# Patient Record
Sex: MALE | Race: WHITE | NOT HISPANIC OR LATINO | URBAN - METROPOLITAN AREA
[De-identification: names, ages, dates, MRNs, and addresses within clinical notes are randomized per-mention and may not be internally consistent; named-entity substitution may affect disease eponyms.]

---

## 2018-01-30 VITALS
SYSTOLIC BLOOD PRESSURE: 143 MMHG | OXYGEN SATURATION: 98 % | HEIGHT: 66 IN | DIASTOLIC BLOOD PRESSURE: 67 MMHG | WEIGHT: 139.99 LBS | TEMPERATURE: 97 F | RESPIRATION RATE: 15 BRPM | HEART RATE: 68 BPM

## 2018-01-30 NOTE — ASU PATIENT PROFILE, ADULT - PMH
Bipolar 1 disorder    CKD (chronic kidney disease)    DM (diabetes mellitus)    Duodenal ulcer    HLD (hyperlipidemia)    HTN (hypertension)    Stented coronary artery    TIA (transient ischemic attack) Bipolar 2 disorder, major depressive episode    CKD (chronic kidney disease)    DM (diabetes mellitus)    Duodenal ulcer    HLD (hyperlipidemia)    HTN (hypertension)    Stented coronary artery  9 stents  TIA (transient ischemic attack)

## 2018-01-30 NOTE — ASU PATIENT PROFILE, ADULT - PSH
Gastric bypass status for obesity  2003  [place] Gastric bypass status for obesity  2003  Hernia  X 2

## 2018-01-31 ENCOUNTER — OUTPATIENT (OUTPATIENT)
Dept: OUTPATIENT SERVICES | Facility: HOSPITAL | Age: 53
LOS: 1 days | Discharge: HOME | End: 2018-01-31
Payer: MEDICARE

## 2018-01-31 VITALS
HEART RATE: 80 BPM | OXYGEN SATURATION: 97 % | RESPIRATION RATE: 18 BRPM | SYSTOLIC BLOOD PRESSURE: 112 MMHG | DIASTOLIC BLOOD PRESSURE: 68 MMHG

## 2018-01-31 DIAGNOSIS — K46.9 UNSPECIFIED ABDOMINAL HERNIA WITHOUT OBSTRUCTION OR GANGRENE: Chronic | ICD-10-CM

## 2018-01-31 DIAGNOSIS — Z98.84 BARIATRIC SURGERY STATUS: Chronic | ICD-10-CM

## 2018-01-31 LAB — GLUCOSE BLDC GLUCOMTR-MCNC: 103 MG/DL — HIGH (ref 70–99)

## 2018-01-31 PROCEDURE — 54235 NJX CORPORA CAVERNOSA RX AGT: CPT

## 2018-01-31 PROCEDURE — 82962 GLUCOSE BLOOD TEST: CPT

## 2018-01-31 PROCEDURE — C1813: CPT

## 2018-01-31 PROCEDURE — 54360 PENIS PLASTIC SURGERY: CPT

## 2018-01-31 PROCEDURE — 54405 INSERT MULTI-COMP PENIS PROS: CPT

## 2018-01-31 PROCEDURE — C1889: CPT

## 2018-01-31 RX ORDER — VANCOMYCIN HCL 1 G
1000 VIAL (EA) INTRAVENOUS ONCE
Qty: 0 | Refills: 0 | Status: COMPLETED | OUTPATIENT
Start: 2018-01-31 | End: 2018-01-31

## 2018-01-31 RX ORDER — MORPHINE SULFATE 50 MG/1
4 CAPSULE, EXTENDED RELEASE ORAL EVERY 4 HOURS
Qty: 0 | Refills: 0 | Status: DISCONTINUED | OUTPATIENT
Start: 2018-01-31 | End: 2018-01-31

## 2018-01-31 RX ORDER — OXYCODONE AND ACETAMINOPHEN 5; 325 MG/1; MG/1
1 TABLET ORAL EVERY 4 HOURS
Qty: 0 | Refills: 0 | Status: DISCONTINUED | OUTPATIENT
Start: 2018-01-31 | End: 2018-01-31

## 2018-01-31 RX ORDER — ONDANSETRON 8 MG/1
4 TABLET, FILM COATED ORAL EVERY 6 HOURS
Qty: 0 | Refills: 0 | Status: DISCONTINUED | OUTPATIENT
Start: 2018-01-31 | End: 2018-01-31

## 2018-01-31 RX ORDER — GENTAMICIN SULFATE 40 MG/ML
80 VIAL (ML) INJECTION ONCE
Qty: 0 | Refills: 0 | Status: DISCONTINUED | OUTPATIENT
Start: 2018-01-31 | End: 2018-01-31

## 2018-01-31 RX ORDER — SODIUM CHLORIDE 9 MG/ML
1000 INJECTION, SOLUTION INTRAVENOUS
Qty: 0 | Refills: 0 | Status: DISCONTINUED | OUTPATIENT
Start: 2018-01-31 | End: 2018-01-31

## 2018-01-31 RX ADMIN — Medication 250 MILLIGRAM(S): at 09:46

## 2018-01-31 NOTE — PACU DISCHARGE NOTE - COMMENTS
Patient given all post op instructions and copy of provider information given to patient. VSS pain well controlled at this time. IV discontinued. Ambulating safely Tolerated po diet. . Left PACU with escort vai wheelchair to lobby.

## 2018-01-31 NOTE — BRIEF OPERATIVE NOTE - PROCEDURE
<<-----Click on this checkbox to enter Procedure Penile prosthesis placement  01/31/2018  w/ correction of penile angulation  Active  ANNEMARIE

## 2018-07-03 ENCOUNTER — NON-APPOINTMENT (OUTPATIENT)
Age: 53
End: 2018-07-03

## 2018-07-03 ENCOUNTER — APPOINTMENT (OUTPATIENT)
Dept: CARDIOLOGY | Facility: CLINIC | Age: 53
End: 2018-07-03

## 2018-07-03 VITALS
DIASTOLIC BLOOD PRESSURE: 76 MMHG | BODY MASS INDEX: 21.21 KG/M2 | SYSTOLIC BLOOD PRESSURE: 138 MMHG | TEMPERATURE: 97.8 F | HEIGHT: 66 IN | OXYGEN SATURATION: 99 % | HEART RATE: 54 BPM | WEIGHT: 132 LBS

## 2018-07-03 DIAGNOSIS — Z86.59 PERSONAL HISTORY OF OTHER MENTAL AND BEHAVIORAL DISORDERS: ICD-10-CM

## 2018-07-03 DIAGNOSIS — Z86.39 PERSONAL HISTORY OF OTHER ENDOCRINE, NUTRITIONAL AND METABOLIC DISEASE: ICD-10-CM

## 2018-07-03 DIAGNOSIS — F17.200 NICOTINE DEPENDENCE, UNSPECIFIED, UNCOMPLICATED: ICD-10-CM

## 2018-07-03 DIAGNOSIS — Z63.5 DISRUPTION OF FAMILY BY SEPARATION AND DIVORCE: ICD-10-CM

## 2018-07-03 PROBLEM — E78.5 HYPERLIPIDEMIA, UNSPECIFIED: Chronic | Status: ACTIVE | Noted: 2018-01-30

## 2018-07-03 PROBLEM — G45.9 TRANSIENT CEREBRAL ISCHEMIC ATTACK, UNSPECIFIED: Chronic | Status: ACTIVE | Noted: 2018-01-30

## 2018-07-03 PROBLEM — I10 ESSENTIAL (PRIMARY) HYPERTENSION: Chronic | Status: ACTIVE | Noted: 2018-01-30

## 2018-07-03 PROBLEM — E11.9 TYPE 2 DIABETES MELLITUS WITHOUT COMPLICATIONS: Chronic | Status: ACTIVE | Noted: 2018-01-30

## 2018-07-03 PROBLEM — N18.9 CHRONIC KIDNEY DISEASE, UNSPECIFIED: Chronic | Status: ACTIVE | Noted: 2018-01-30

## 2018-07-03 PROBLEM — Z95.5 PRESENCE OF CORONARY ANGIOPLASTY IMPLANT AND GRAFT: Chronic | Status: ACTIVE | Noted: 2018-01-30

## 2018-07-03 PROBLEM — F31.81 BIPOLAR II DISORDER: Chronic | Status: ACTIVE | Noted: 2018-01-30

## 2018-07-03 PROBLEM — K26.9 DUODENAL ULCER, UNSPECIFIED AS ACUTE OR CHRONIC, WITHOUT HEMORRHAGE OR PERFORATION: Chronic | Status: ACTIVE | Noted: 2018-01-30

## 2018-07-03 RX ORDER — DIVALPROEX SODIUM 500 1/1
500 TABLET, EXTENDED RELEASE ORAL
Qty: 90 | Refills: 0 | Status: ACTIVE | COMMUNITY
Start: 2017-07-28

## 2018-07-03 RX ORDER — MUPIROCIN 20 MG/G
2 OINTMENT TOPICAL
Qty: 22 | Refills: 0 | Status: ACTIVE | COMMUNITY
Start: 2018-03-15

## 2018-07-03 RX ORDER — ATORVASTATIN CALCIUM 80 MG/1
80 TABLET, FILM COATED ORAL
Qty: 90 | Refills: 0 | Status: ACTIVE | COMMUNITY
Start: 2018-01-11

## 2018-07-03 RX ORDER — PANTOPRAZOLE 40 MG/1
40 TABLET, DELAYED RELEASE ORAL
Qty: 30 | Refills: 0 | Status: ACTIVE | COMMUNITY
Start: 2017-02-06

## 2018-07-03 RX ORDER — LAMOTRIGINE 200 MG/1
200 TABLET ORAL
Qty: 90 | Refills: 0 | Status: ACTIVE | COMMUNITY
Start: 2017-11-24

## 2018-07-03 RX ORDER — BLOOD SUGAR DIAGNOSTIC
STRIP MISCELLANEOUS
Qty: 300 | Refills: 0 | Status: ACTIVE | COMMUNITY
Start: 2017-12-21

## 2018-07-03 SDOH — SOCIAL STABILITY - SOCIAL INSECURITY: DISRUPTION OF FAMILY BY SEPARATION AND DIVORCE: Z63.5

## 2019-03-26 ENCOUNTER — APPOINTMENT (OUTPATIENT)
Dept: HEMATOLOGY ONCOLOGY | Facility: CLINIC | Age: 54
End: 2019-03-26
Payer: MEDICARE

## 2019-03-26 ENCOUNTER — RESULT REVIEW (OUTPATIENT)
Age: 54
End: 2019-03-26

## 2019-03-26 VITALS
HEART RATE: 71 BPM | SYSTOLIC BLOOD PRESSURE: 162 MMHG | WEIGHT: 132.98 LBS | HEIGHT: 65.55 IN | OXYGEN SATURATION: 100 % | BODY MASS INDEX: 21.89 KG/M2 | RESPIRATION RATE: 16 BRPM | DIASTOLIC BLOOD PRESSURE: 75 MMHG | TEMPERATURE: 98.9 F

## 2019-03-26 DIAGNOSIS — Z87.898 PERSONAL HISTORY OF OTHER SPECIFIED CONDITIONS: ICD-10-CM

## 2019-03-26 DIAGNOSIS — Z82.49 FAMILY HISTORY OF ISCHEMIC HEART DISEASE AND OTHER DISEASES OF THE CIRCULATORY SYSTEM: ICD-10-CM

## 2019-03-26 DIAGNOSIS — F10.21 ALCOHOL DEPENDENCE, IN REMISSION: ICD-10-CM

## 2019-03-26 DIAGNOSIS — Z80.0 FAMILY HISTORY OF MALIGNANT NEOPLASM OF DIGESTIVE ORGANS: ICD-10-CM

## 2019-03-26 PROCEDURE — 99205 OFFICE O/P NEW HI 60 MIN: CPT

## 2019-03-26 RX ORDER — METOPROLOL TARTRATE 25 MG/1
25 TABLET, FILM COATED ORAL TWICE DAILY
Qty: 180 | Refills: 3 | Status: DISCONTINUED | COMMUNITY
Start: 2018-03-28 | End: 2019-03-26

## 2019-03-26 RX ORDER — VENLAFAXINE HYDROCHLORIDE 150 MG/1
150 CAPSULE, EXTENDED RELEASE ORAL
Qty: 90 | Refills: 0 | Status: DISCONTINUED | COMMUNITY
Start: 2018-04-13 | End: 2019-03-26

## 2019-03-27 RX ORDER — CHOLECALCIFEROL (VITAMIN D3) 1250 MCG
1.25 MG CAPSULE ORAL
Qty: 12 | Refills: 0 | Status: ACTIVE | COMMUNITY
Start: 2019-03-27 | End: 1900-01-01

## 2019-04-13 NOTE — ASSESSMENT
[FreeTextEntry1] : 52 yo male referred for evaluation of iron deficiency anemia and anemia due to chronic kidney disease.\par Patient underwent gastric bypass surgery and required intermittent iron infusion.\par In addition he has renal failure secondary o DM, with creatinine of 2.8 and EGF 24. 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 \par \par PLan \par - anemia work up with copper and zinc levels\par - consider intermittent hydration IV in view of increased BUN and creatinine \par - IV iron for ferritin < 50\par - EPO for HG < 10

## 2019-04-13 NOTE — HISTORY OF PRESENT ILLNESS
[de-identified] : 53 year old male presents today for initial consult of iron deficiency anemia.  He is s/p gastric bypass in 2003. He had GI bleed a few years later from GI bleed secondary to NSAIDs.  His hgb went to 3. His pouch had to be revised.  He became iron deficient. He did not tolerate oral iron and receives Venofer.  He is having left upper quadrant pain and is having balloon endoscopy at [facility].  Labs dated 3/12/19 show a HGB 10.3, HCT 32.0, MCHC 32.2.  Also noted is a creatinine of 3.0

## 2019-04-13 NOTE — CONSULT LETTER
[Dear  ___] : Dear  [unfilled], [Consult Letter:] : I had the pleasure of evaluating your patient, [unfilled]. [Please see my note below.] : Please see my note below. [Sincerely,] : Sincerely, [Consult Closing:] : Thank you very much for allowing me to participate in the care of this patient.  If you have any questions, please do not hesitate to contact me. [FreeTextEntry3] : Phyllis Pizarro MD\par [facility] [place] at [facility]\par

## 2019-05-07 ENCOUNTER — RESULT REVIEW (OUTPATIENT)
Age: 54
End: 2019-05-07

## 2019-05-14 ENCOUNTER — APPOINTMENT (OUTPATIENT)
Dept: HEMATOLOGY ONCOLOGY | Facility: CLINIC | Age: 54
End: 2019-05-14

## 2019-06-24 ENCOUNTER — APPOINTMENT (OUTPATIENT)
Dept: HEMATOLOGY ONCOLOGY | Facility: CLINIC | Age: 54
End: 2019-06-24
Payer: MEDICARE

## 2019-06-24 VITALS
SYSTOLIC BLOOD PRESSURE: 147 MMHG | BODY MASS INDEX: 24.03 KG/M2 | RESPIRATION RATE: 20 BRPM | OXYGEN SATURATION: 98 % | HEIGHT: 65.55 IN | TEMPERATURE: 99 F | WEIGHT: 146 LBS | DIASTOLIC BLOOD PRESSURE: 68 MMHG | HEART RATE: 72 BPM

## 2019-06-24 PROCEDURE — 99214 OFFICE O/P EST MOD 30 MIN: CPT

## 2019-06-24 NOTE — HISTORY OF PRESENT ILLNESS
[de-identified] : 53 year old male presents today for initial consult of iron deficiency anemia.  He is s/p gastric bypass in 2003. He had GI bleed a few years later from GI bleed secondary to NSAIDs.  His hgb went to 3. His pouch had to be revised.  He became iron deficient. He did not tolerate oral iron and receives Venofer.  He is having left upper quadrant pain and is having balloon endoscopy at [facility].  Labs dated 3/12/19 show a HGB 10.3, HCT 32.0, MCHC 32.2.  Also noted is a creatinine of 3.0 [de-identified] : Patient presetns today for follow up of anemia- having fatigue- had labs done last Thursday.

## 2019-06-24 NOTE — CONSULT LETTER
[FreeTextEntry3] : Phyllis Pizarro MD\par [facility] [place] at [facility]\par

## 2019-06-24 NOTE — ASSESSMENT
[FreeTextEntry1] : 54 yo male referred for evaluation of iron deficiency anemia and anemia due to chronic kidney disease.\par Patient underwent gastric bypass surgery and required intermittent iron infusion.\par In addition he has renal failure secondary o DM, with creatinine of 2.8 and EGF 24. 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 \par \par PLan \par - copper and zinc levels WNL\par -Ferritin 45, patient with fatigue, anemia, malabsorption\par - schedule IV Injectafer x 2

## 2019-07-29 ENCOUNTER — NON-APPOINTMENT (OUTPATIENT)
Age: 54
End: 2019-07-29

## 2019-07-29 ENCOUNTER — APPOINTMENT (OUTPATIENT)
Dept: INTERNAL MEDICINE | Facility: CLINIC | Age: 54
End: 2019-07-29
Payer: MEDICARE

## 2019-07-29 VITALS
HEIGHT: 65.5 IN | SYSTOLIC BLOOD PRESSURE: 110 MMHG | HEART RATE: 72 BPM | BODY MASS INDEX: 23.04 KG/M2 | WEIGHT: 140 LBS | OXYGEN SATURATION: 98 % | DIASTOLIC BLOOD PRESSURE: 70 MMHG

## 2019-07-29 DIAGNOSIS — R06.00 DYSPNEA, UNSPECIFIED: ICD-10-CM

## 2019-07-29 PROCEDURE — 94010 BREATHING CAPACITY TEST: CPT

## 2019-07-29 PROCEDURE — 99204 OFFICE O/P NEW MOD 45 MIN: CPT | Mod: 25

## 2019-07-29 NOTE — HISTORY OF PRESENT ILLNESS
[FreeTextEntry1] : Patient with waking up gasping for air [de-identified] : Patient is a 54-year-old smoker of one pack per day for more than 30 years. He has a history of coronary artery disease with stents x11, history of diabetes and chronic kidney disease. The past one month he complains of waking up about 5-10 minutes after falling asleep with gasping for aunt. This occurs for a very short period of time and he is able to fall back asleep. He denies loud snoring. He is sleeps alone. He is not sure of witnessed apneas. He did have a history of sleep apnea syndrome when he was 280 pounds 16 years ago. He now weighs 135 pounds. He denies shortness of breath with exertion and is able to walk a mile without any problem. He does have an occasional cough with light yellow sputum. He denies wheezing. He again has no problems with shortness of breath during the day. His only problem is waking up gasping for air assessment going on for the past one month.

## 2019-07-29 NOTE — PHYSICAL EXAM
[No Acute Distress] : no acute distress [Well Nourished] : well nourished [Well Developed] : well developed [Well-Appearing] : well-appearing [Normal Sclera/Conjunctiva] : normal sclera/conjunctiva [PERRL] : pupils equal round and reactive to light [No JVD] : no jugular venous distention [Normal Outer Ear/Nose] : the outer ears and nose were normal in appearance [Normal Oropharynx] : the oropharynx was normal [EOMI] : extraocular movements intact [No Lymphadenopathy] : no lymphadenopathy [Supple] : supple [Thyroid Normal, No Nodules] : the thyroid was normal and there were no nodules present [Clear to Auscultation] : lungs were clear to auscultation bilaterally [No Respiratory Distress] : no respiratory distress  [No Accessory Muscle Use] : no accessory muscle use [Regular Rhythm] : with a regular rhythm [Normal Rate] : normal rate  [No Carotid Bruits] : no carotid bruits [Normal S1, S2] : normal S1 and S2 [No Murmur] : no murmur heard [Pedal Pulses Present] : the pedal pulses are present [No Abdominal Bruit] : a ~M bruit was not heard ~T in the abdomen [No Varicosities] : no varicosities [No Extremity Clubbing/Cyanosis] : no extremity clubbing/cyanosis [No Palpable Aorta] : no palpable aorta [No Edema] : there was no peripheral edema [Non-distended] : non-distended [Non Tender] : non-tender [Soft] : abdomen soft [No Masses] : no abdominal mass palpated [No HSM] : no HSM [Normal Posterior Cervical Nodes] : no posterior cervical lymphadenopathy [Normal Bowel Sounds] : normal bowel sounds [Normal Anterior Cervical Nodes] : no anterior cervical lymphadenopathy [No Spinal Tenderness] : no spinal tenderness [No CVA Tenderness] : no CVA  tenderness [No Rash] : no rash [Grossly Normal Strength/Tone] : grossly normal strength/tone [No Joint Swelling] : no joint swelling [Normal Gait] : normal gait [No Focal Deficits] : no focal deficits [Coordination Grossly Intact] : coordination grossly intact [Normal Affect] : the affect was normal [Deep Tendon Reflexes (DTR)] : deep tendon reflexes were 2+ and symmetric [Normal Insight/Judgement] : insight and judgment were intact

## 2019-07-29 NOTE — ASSESSMENT
[FreeTextEntry1] : Patient with a one-month history of occasionally waking up gasping for air. Patient's spirometry reveals mild obstructive obstruction with an FEV1 of 75% of predicted. FVC is 99% of predicted. The etiology of this complaint is somewhat unclear. I cannot rule out obstructive sleep apnea syndrome without a sleep study. I am recommending the patient observe the symptoms of the following one month and to call me at that time. We may try to get a sleep study approved. In the meanwhile I would recommend the patient consider a low dose CT of the chest for lung cancer screening in view of the history of heavy smoking exposure.

## 2019-07-29 NOTE — PLAN
[FreeTextEntry1] : Patient is to observe symptoms of waking up gasping in one month and to call me at that time. We may consider obtaining a sleep study. Patient is also advised to have a low dose CT of the chest for lung cancer screening.

## 2019-07-31 ENCOUNTER — APPOINTMENT (OUTPATIENT)
Dept: NEUROLOGY | Facility: CLINIC | Age: 54
End: 2019-07-31
Payer: MEDICARE

## 2019-07-31 VITALS
HEIGHT: 65.5 IN | BODY MASS INDEX: 23.04 KG/M2 | TEMPERATURE: 98 F | WEIGHT: 140 LBS | HEART RATE: 65 BPM | DIASTOLIC BLOOD PRESSURE: 70 MMHG | SYSTOLIC BLOOD PRESSURE: 110 MMHG

## 2019-07-31 PROCEDURE — 99204 OFFICE O/P NEW MOD 45 MIN: CPT

## 2019-07-31 NOTE — PHYSICAL EXAM
[General Appearance - Alert] : alert [General Appearance - In No Acute Distress] : in no acute distress [Oriented To Time, Place, And Person] : oriented to person, place, and time [Impaired Insight] : insight and judgment were intact [Affect] : the affect was normal [Person] : oriented to person [Place] : oriented to place [Time] : oriented to time [Concentration Intact] : normal concentrating ability [Visual Intact] : visual attention was ~T not ~L decreased [Naming Objects] : no difficulty naming common objects [Repeating Phrases] : no difficulty repeating a phrase [Writing A Sentence] : no difficulty writing a sentence [Fluency] : fluency intact [Comprehension] : comprehension intact [Reading] : reading intact [Past History] : adequate knowledge of personal past history [Cranial Nerves Optic (II)] : visual acuity intact bilaterally,  visual fields full to confrontation, pupils equal round and reactive to light [Cranial Nerves Oculomotor (III)] : extraocular motion intact [Cranial Nerves Trigeminal (V)] : facial sensation intact symmetrically [Cranial Nerves Facial (VII)] : face symmetrical [Cranial Nerves Vestibulocochlear (VIII)] : hearing was intact bilaterally [Cranial Nerves Glossopharyngeal (IX)] : tongue and palate midline [Cranial Nerves Accessory (XI - Cranial And Spinal)] : head turning and shoulder shrug symmetric [Cranial Nerves Hypoglossal (XII)] : there was no tongue deviation with protrusion [Motor Tone] : muscle tone was normal in all four extremities [Motor Strength] : muscle strength was normal in all four extremities [No Muscle Atrophy] : normal bulk in all four extremities [Abnormal Walk] : normal gait [Balance] : balance was intact [2+] : Ankle jerk left 2+ [Tremor] : no tremor present [Past-pointing] : there was no past-pointing [Plantar Reflex Left Only] : normal on the left [Plantar Reflex Right Only] : normal on the right [FreeTextEntry7] : decreased    peripherally    to pin

## 2019-07-31 NOTE — ASSESSMENT
[FreeTextEntry1] : likely  vaso-  vagal   syncope\par r/o  ANS    dysfunction  (due    to  DM),   would  like  to  refer    to   Tilt  table   test\par MRI    of  the    brain\par EEG\par TIA   is  also    possibility,  he    has   an increased    risk   for    TIA\par Sufficient     hydration    is   crucial, however  challenging     due  to    CKD.  (mild   LE  peripheral    edema).  Would     consider     compression   stockings.

## 2019-07-31 NOTE — HISTORY OF PRESENT ILLNESS
[FreeTextEntry1] : This is  a     54    y.o.  male     who presented   for    evaluation  of   episode    of   LOC  while    lifting  a    heavy     weight.    He    denies  a    clear    prodromal   symptoms:  no   heart   palpitations,  no  CP,   no     diaphoresis     or     visual   disturbance.     he   denies        similar     episodes  in the    past.    He   states that    after    he   regained  the    consciousness     he    was   AAOx3. He    has    been    followed   by   cardiologist  and   has  lacie  appointment   with     his   cardiologist   today.  He    reports     mild    tinging   and  numbness    in  his  toes    which    has   been    attributed  to  the     h/o    DM.   He     states    that   it    is  a  possibility      that    he    was  dehydrated   at the    time    of  the    episode.   He    has   a   h/o   anemia (iron    deficiency).  S/p    bariatric   surgery.

## 2019-07-31 NOTE — REVIEW OF SYSTEMS
[Heartburn] : heartburn [Negative] : Heme/Lymph [Numbness] : numbness [Tingling] : tingling [Fainting] : fainting [FreeTextEntry2] : fatigue [FreeTextEntry5] : leg swelling,neck pain

## 2019-08-02 ENCOUNTER — APPOINTMENT (OUTPATIENT)
Dept: NEUROLOGY | Facility: CLINIC | Age: 54
End: 2019-08-02

## 2019-08-09 ENCOUNTER — APPOINTMENT (OUTPATIENT)
Dept: NEUROLOGY | Facility: CLINIC | Age: 54
End: 2019-08-09

## 2019-08-13 ENCOUNTER — APPOINTMENT (OUTPATIENT)
Dept: NEUROLOGY | Facility: CLINIC | Age: 54
End: 2019-08-13
Payer: MEDICARE

## 2019-08-13 PROCEDURE — 95819 EEG AWAKE AND ASLEEP: CPT

## 2019-08-15 ENCOUNTER — RESULT REVIEW (OUTPATIENT)
Age: 54
End: 2019-08-15

## 2019-08-21 ENCOUNTER — OTHER (OUTPATIENT)
Age: 54
End: 2019-08-21

## 2019-08-22 ENCOUNTER — OTHER (OUTPATIENT)
Age: 54
End: 2019-08-22

## 2019-08-23 ENCOUNTER — RESULT REVIEW (OUTPATIENT)
Age: 54
End: 2019-08-23

## 2019-09-03 ENCOUNTER — NON-APPOINTMENT (OUTPATIENT)
Age: 54
End: 2019-09-03

## 2019-09-03 ENCOUNTER — APPOINTMENT (OUTPATIENT)
Dept: CARDIOLOGY | Facility: CLINIC | Age: 54
End: 2019-09-03
Payer: MEDICARE

## 2019-09-03 VITALS
OXYGEN SATURATION: 99 % | DIASTOLIC BLOOD PRESSURE: 74 MMHG | RESPIRATION RATE: 12 BRPM | BODY MASS INDEX: 23.43 KG/M2 | WEIGHT: 143 LBS | SYSTOLIC BLOOD PRESSURE: 90 MMHG | HEART RATE: 60 BPM

## 2019-09-03 DIAGNOSIS — R42 DIZZINESS AND GIDDINESS: ICD-10-CM

## 2019-09-03 DIAGNOSIS — R55 SYNCOPE AND COLLAPSE: ICD-10-CM

## 2019-09-03 PROCEDURE — 99214 OFFICE O/P EST MOD 30 MIN: CPT

## 2019-09-03 PROCEDURE — 93000 ELECTROCARDIOGRAM COMPLETE: CPT

## 2019-09-03 RX ORDER — ISOSORBIDE MONONITRATE 30 MG/1
30 TABLET, EXTENDED RELEASE ORAL
Qty: 30 | Refills: 0 | Status: ACTIVE | COMMUNITY
Start: 2019-01-29

## 2019-09-03 RX ORDER — INSULIN GLARGINE 100 [IU]/ML
100 INJECTION, SOLUTION SUBCUTANEOUS
Qty: 3 | Refills: 0 | Status: ACTIVE | COMMUNITY
Start: 2019-05-21

## 2019-09-03 NOTE — DISCUSSION/SUMMARY
[FreeTextEntry1] : Dyspnea\par Patient with a significant cardiac history, details not available to me at this time.\par He reports feeling dyspneic upon trying to lie down today, on a background of 4-5 days of a upper respiratory symptoms in the form of productive cough of greenish sputum, congestion. He also gets allergies and says they have been bad this year. He takes OTC Claritin\par He says that he is cardiac symptom is usually chest pressure which he hasn't had\par His EKG today is normal\par I suspect his symptoms is related to he is allergies/possible URI\par There is the suggestion of congestive heart failure\par Rec:\par CXR to r/o infiltrate -> call to discuss\par Same medical regimen for now\par \par 2.  HTN\par Treated. Blood pressure is slightly suboptimal given history.\par Rec:\par Follow for now. given context\par \par 3.   CAD\par History of CAD with reported multiple stents in the past\par No symptoms to suggest angina at this time\par EKG is as described\par Rec:\par Same regimen\par \par \par

## 2019-09-04 ENCOUNTER — RESULT REVIEW (OUTPATIENT)
Age: 54
End: 2019-09-04

## 2019-09-11 ENCOUNTER — APPOINTMENT (OUTPATIENT)
Dept: INTERNAL MEDICINE | Facility: CLINIC | Age: 54
End: 2019-09-11
Payer: MEDICARE

## 2019-09-11 VITALS
WEIGHT: 147 LBS | BODY MASS INDEX: 24.2 KG/M2 | SYSTOLIC BLOOD PRESSURE: 120 MMHG | OXYGEN SATURATION: 97 % | HEART RATE: 72 BPM | DIASTOLIC BLOOD PRESSURE: 60 MMHG | HEIGHT: 65.5 IN

## 2019-09-11 PROCEDURE — 99213 OFFICE O/P EST LOW 20 MIN: CPT

## 2019-09-11 NOTE — HISTORY OF PRESENT ILLNESS
[FreeTextEntry1] : Followup to lung cancer screening CAT scan [de-identified] : Patient continues to smoke one pack per day. He is here now to discuss the results of his screening CT scan. This reveals 3 separate 4 mm nodules. There is a calcified right lower lobe nodule, a left-sided haroon-fissural nodule and a 4 mm left upper lobe nodule that was present in 2013. Overall these nodules are highly likely to be benign and no concerns. Patient is reassured.

## 2019-09-11 NOTE — PHYSICAL EXAM
[No Acute Distress] : no acute distress [Well Nourished] : well nourished [Well Developed] : well developed [Well-Appearing] : well-appearing [Normal Sclera/Conjunctiva] : normal sclera/conjunctiva [PERRL] : pupils equal round and reactive to light [EOMI] : extraocular movements intact [Normal Outer Ear/Nose] : the outer ears and nose were normal in appearance [Normal Oropharynx] : the oropharynx was normal [No JVD] : no jugular venous distention [Supple] : supple [No Lymphadenopathy] : no lymphadenopathy [Thyroid Normal, No Nodules] : the thyroid was normal and there were no nodules present [No Respiratory Distress] : no respiratory distress  [No Accessory Muscle Use] : no accessory muscle use [Clear to Auscultation] : lungs were clear to auscultation bilaterally [Normal Rate] : normal rate  [Regular Rhythm] : with a regular rhythm [Normal S1, S2] : normal S1 and S2 [No Murmur] : no murmur heard [No Carotid Bruits] : no carotid bruits [No Abdominal Bruit] : a ~M bruit was not heard ~T in the abdomen [No Varicosities] : no varicosities [Pedal Pulses Present] : the pedal pulses are present [No Edema] : there was no peripheral edema [No Palpable Aorta] : no palpable aorta [No Extremity Clubbing/Cyanosis] : no extremity clubbing/cyanosis [Soft] : abdomen soft [Non Tender] : non-tender [Non-distended] : non-distended [No Masses] : no abdominal mass palpated [No HSM] : no HSM [Normal Posterior Cervical Nodes] : no posterior cervical lymphadenopathy [Normal Bowel Sounds] : normal bowel sounds [Normal Anterior Cervical Nodes] : no anterior cervical lymphadenopathy [No CVA Tenderness] : no CVA  tenderness [No Spinal Tenderness] : no spinal tenderness [No Joint Swelling] : no joint swelling [Grossly Normal Strength/Tone] : grossly normal strength/tone [No Rash] : no rash [Coordination Grossly Intact] : coordination grossly intact [No Focal Deficits] : no focal deficits [Normal Gait] : normal gait [Deep Tendon Reflexes (DTR)] : deep tendon reflexes were 2+ and symmetric [Normal Affect] : the affect was normal [Normal Insight/Judgement] : insight and judgment were intact

## 2019-09-11 NOTE — ASSESSMENT
[FreeTextEntry1] : Pulmonary nodules are highly likely to be benign. The patient is reassured. He is advised to repeat the CAT scan in one year. In terms of his continual smoking I've advised the patient tried the nicotine patch as well as the nicotine gum if he has cravings.

## 2019-10-02 ENCOUNTER — APPOINTMENT (OUTPATIENT)
Dept: HEMATOLOGY ONCOLOGY | Facility: CLINIC | Age: 54
End: 2019-10-02

## 2019-10-04 ENCOUNTER — APPOINTMENT (OUTPATIENT)
Dept: NEUROLOGY | Facility: CLINIC | Age: 54
End: 2019-10-04

## 2019-11-20 ENCOUNTER — FORM ENCOUNTER (OUTPATIENT)
Age: 54
End: 2019-11-20

## 2019-11-26 ENCOUNTER — FORM ENCOUNTER (OUTPATIENT)
Age: 54
End: 2019-11-26

## 2019-11-27 ENCOUNTER — APPOINTMENT (OUTPATIENT)
Dept: CARDIOLOGY | Facility: CLINIC | Age: 54
End: 2019-11-27
Payer: MEDICARE

## 2019-11-27 ENCOUNTER — NON-APPOINTMENT (OUTPATIENT)
Age: 54
End: 2019-11-27

## 2019-11-27 VITALS
OXYGEN SATURATION: 99 % | BODY MASS INDEX: 23.76 KG/M2 | SYSTOLIC BLOOD PRESSURE: 138 MMHG | DIASTOLIC BLOOD PRESSURE: 80 MMHG | WEIGHT: 145 LBS | RESPIRATION RATE: 12 BRPM | HEART RATE: 58 BPM

## 2019-11-27 DIAGNOSIS — R07.9 CHEST PAIN, UNSPECIFIED: ICD-10-CM

## 2019-11-27 PROCEDURE — 93000 ELECTROCARDIOGRAM COMPLETE: CPT

## 2019-11-27 PROCEDURE — 99214 OFFICE O/P EST MOD 30 MIN: CPT

## 2019-11-27 NOTE — ASSESSMENT
[FreeTextEntry1] : 55 yo male with reported CAD and multiple prior coronary stents (last PCI reportedly in Feb 2019). He presents with episode of epigastric/chest discomfort which began this morning, but is different from his usual anginal symptoms.\par \par ECG today demonstrated sinus bradycardia with no acute ST-T changes from his prior ECG in Sept 2019.\par Will continue to monitor clinically for now on current medical management. \par Should his symptoms recur or become associated with exertion, he should contact Dr. Mccall at [place] or my office to arrange for further evaluation with either nuclear stress test or cardiac catheterization given his reported extensive cardiac history.\par Will try to obtain recent 2/2019 cardiac cath report from [place] for review to determine extent of his CAD and prior stents as I do not any details regarding his prior coronary interventions.

## 2019-11-27 NOTE — HISTORY OF PRESENT ILLNESS
[FreeTextEntry1] : 53 yo male with reported CAD and multiple prior coronary stents, who presents today with complaint of recurrent vertigo. He reports being told he had a mildly elevated K+ = 5.2 and was concerned about possible arrhythmias as cause of his reported vertigo and episode of syncope ~ 1. 5 months ago. He reports intermittent vertigo over the past week and reported a syncopal episode ~ 1.5 months ago. He reported extensive neurologic examination at [place], which was negative and was thought to be vasovagal in etiology. He is currently pending a tilt table study.  \par \par Cardiologist:  Dr. Amari Martínez ([place])\par Interventional cardiology: Dr. Mccall ([place])\par Renal: Dr. Butler ([place])

## 2019-11-27 NOTE — REASON FOR VISIT
[Follow-Up - Clinic] : a clinic follow-up of [Chest Pain] : chest pain [Coronary Artery Disease] : coronary artery disease [Syncope] : syncope

## 2019-11-27 NOTE — ASSESSMENT
[FreeTextEntry1] : 53 yo male with reported CAD and multiple prior coronary stents, with reported syncope ~1.5 months ago and now recurrent vertigo over the past week. \par \par Given reported vertigo with changes in his position/head movement, suspect that he may have benign paroxysmal positional vertigo. \par With regard to his reported syncope, he may have had a vasovagal episode and is currently pending further evaluation with tilt table study. \par ECG today does not demonstrate QT prolongation or pre-excitation pattern.\par He reports extensive recent neurologic evaluation, which has been negative to date.\par Patient was advised to proceed with his tilt table study. If tilt table study is unremarkable, patient may need echocardiogram and event monitor if not done recently with his cardiologist at [place]. Patient to notify this office of results of tilt table study.\par \par BP is adequately controlled currently. Patient was advised to speak with his nephrologist about his chlorthalidone as his diuretic use may also be a contributing factor to his current symptoms if he becomes intravascularly depleted.

## 2019-11-27 NOTE — HISTORY OF PRESENT ILLNESS
[FreeTextEntry1] : 53 yo male with reported CAD and multiple prior coronary stents (most recent PCI x2 in Feb 2019 at [place] per patient's report), who presents today with complaint substernal/epigastric chest discomfort which began this morning at rest. He describes it as vague discomfort and thinks it may be GI in etiology as it is different from his prior angina. He denies associated dyspnea, nausea, or vomiting. Patient denies palpitations, syncope, edema, melena, hematochezia, or hematemesis.\par \par Interventional cardiologist: Dr. Mccall ([place])\par Renal: Dr. Butler ([place])

## 2019-11-27 NOTE — PHYSICAL EXAM
[General Appearance - Well Developed] : well developed [General Appearance - Well Nourished] : well nourished [Normal Conjunctiva] : the conjunctiva exhibited no abnormalities [] : no respiratory distress [Respiration, Rhythm And Depth] : normal respiratory rhythm and effort [Auscultation Breath Sounds / Voice Sounds] : lungs were clear to auscultation bilaterally [Abnormal Walk] : normal gait [Nail Clubbing] : no clubbing of the fingernails [Cyanosis, Localized] : no localized cyanosis [Oriented To Time, Place, And Person] : oriented to person, place, and time [Normal Rate] : normal [Rhythm Regular] : regular [Normal S1] : normal S1 [Normal S2] : normal S2 [No Murmur] : no murmurs heard [No Pitting Edema] : no pitting edema present [FreeTextEntry1] : No JVD present [S3] : no S3 [S4] : no S4

## 2019-11-27 NOTE — REVIEW OF SYSTEMS
[Negative] : Heme/Lymph [Chest Pain] : chest pain [Shortness Of Breath] : no shortness of breath [Dyspnea on exertion] : not dyspnea during exertion [Lower Ext Edema] : no extremity edema [Palpitations] : no palpitations

## 2019-11-27 NOTE — PHYSICAL EXAM
[General Appearance - Well Developed] : well developed [General Appearance - Well Nourished] : well nourished [Normal Conjunctiva] : the conjunctiva exhibited no abnormalities [Heart Rate And Rhythm] : heart rate and rhythm were normal [Heart Sounds] : normal S1 and S2 [Murmurs] : no murmurs present [Bowel Sounds] : normal bowel sounds [Abdomen Soft] : soft [Abdomen Tenderness] : non-tender [Abnormal Walk] : normal gait [Skin Color & Pigmentation] : normal skin color and pigmentation [Oriented To Time, Place, And Person] : oriented to person, place, and time [] : no respiratory distress [Respiration, Rhythm And Depth] : normal respiratory rhythm and effort [Auscultation Breath Sounds / Voice Sounds] : lungs were clear to auscultation bilaterally [Normal Rate] : normal [Rhythm Regular] : regular [Normal S1] : normal S1 [Normal S2] : normal S2 [No Murmur] : no murmurs heard [No Pitting Edema] : no pitting edema present [Nail Clubbing] : no clubbing of the fingernails [Cyanosis, Localized] : no localized cyanosis [FreeTextEntry1] : No JVD present [S3] : no S3 [S4] : no S4

## 2020-06-01 ENCOUNTER — APPOINTMENT (OUTPATIENT)
Dept: CARDIOLOGY | Facility: CLINIC | Age: 55
End: 2020-06-01

## 2020-06-01 VITALS
BODY MASS INDEX: 29.54 KG/M2 | WEIGHT: 180.25 LBS | OXYGEN SATURATION: 96 % | DIASTOLIC BLOOD PRESSURE: 80 MMHG | HEART RATE: 77 BPM | SYSTOLIC BLOOD PRESSURE: 130 MMHG

## 2020-06-01 VITALS
BODY MASS INDEX: 29.54 KG/M2 | SYSTOLIC BLOOD PRESSURE: 130 MMHG | WEIGHT: 180.25 LBS | HEART RATE: 77 BPM | OXYGEN SATURATION: 96 % | DIASTOLIC BLOOD PRESSURE: 80 MMHG

## 2020-07-16 ENCOUNTER — APPOINTMENT (OUTPATIENT)
Dept: CARDIOLOGY | Facility: CLINIC | Age: 55
End: 2020-07-16
Payer: MEDICARE

## 2020-07-16 VITALS
SYSTOLIC BLOOD PRESSURE: 110 MMHG | HEART RATE: 73 BPM | OXYGEN SATURATION: 99 % | DIASTOLIC BLOOD PRESSURE: 70 MMHG | BODY MASS INDEX: 23.43 KG/M2 | WEIGHT: 143 LBS

## 2020-07-16 DIAGNOSIS — F14.90 COCAINE USE, UNSPECIFIED, UNCOMPLICATED: ICD-10-CM

## 2020-07-16 DIAGNOSIS — F17.200 NICOTINE DEPENDENCE, UNSPECIFIED, UNCOMPLICATED: ICD-10-CM

## 2020-07-16 DIAGNOSIS — E78.5 HYPERLIPIDEMIA, UNSPECIFIED: ICD-10-CM

## 2020-07-16 DIAGNOSIS — Z98.890 OTHER SPECIFIED POSTPROCEDURAL STATES: ICD-10-CM

## 2020-07-16 DIAGNOSIS — F10.21 ALCOHOL DEPENDENCE, IN REMISSION: ICD-10-CM

## 2020-07-16 DIAGNOSIS — I10 ESSENTIAL (PRIMARY) HYPERTENSION: ICD-10-CM

## 2020-07-16 DIAGNOSIS — Z86.73 PERSONAL HISTORY OF TRANSIENT ISCHEMIC ATTACK (TIA), AND CEREBRAL INFARCTION W/OUT RESIDUAL DEFICITS: ICD-10-CM

## 2020-07-16 PROCEDURE — 93000 ELECTROCARDIOGRAM COMPLETE: CPT

## 2020-07-16 PROCEDURE — 99215 OFFICE O/P EST HI 40 MIN: CPT

## 2020-07-19 ENCOUNTER — NON-APPOINTMENT (OUTPATIENT)
Age: 55
End: 2020-07-19

## 2020-07-20 PROBLEM — E78.5 HYPERLIPIDEMIA, UNSPECIFIED HYPERLIPIDEMIA TYPE: Status: ACTIVE | Noted: 2018-07-03

## 2020-07-20 PROBLEM — Z98.890 HISTORY OF CARDIAC CATH: Status: RESOLVED | Noted: 2020-07-20 | Resolved: 2020-07-20

## 2020-07-20 PROBLEM — I10 BENIGN ESSENTIAL HTN: Status: ACTIVE | Noted: 2018-07-03

## 2020-07-20 PROBLEM — Z86.73 HISTORY OF TIA (TRANSIENT ISCHEMIC ATTACK): Status: RESOLVED | Noted: 2020-07-16 | Resolved: 2020-07-20

## 2020-07-20 PROBLEM — F17.200 SMOKER: Status: ACTIVE | Noted: 2019-07-29

## 2020-07-20 RX ORDER — GLIPIZIDE 10 MG/1
10 TABLET, FILM COATED, EXTENDED RELEASE ORAL
Qty: 180 | Refills: 1 | Status: ACTIVE | COMMUNITY
Start: 2019-03-04

## 2020-07-20 RX ORDER — NITROGLYCERIN 0.4 MG/1
0.4 TABLET SUBLINGUAL
Qty: 25 | Refills: 0 | Status: ACTIVE | COMMUNITY
Start: 2020-04-17

## 2020-07-20 RX ORDER — ALFUZOSIN HYDROCHLORIDE 10 MG/1
10 TABLET, EXTENDED RELEASE ORAL
Qty: 90 | Refills: 0 | Status: ACTIVE | COMMUNITY
Start: 2019-11-25

## 2020-07-20 RX ORDER — ERGOCALCIFEROL 1.25 MG/1
1.25 MG CAPSULE, LIQUID FILLED ORAL
Qty: 12 | Refills: 0 | Status: DISCONTINUED | COMMUNITY
Start: 2017-11-03 | End: 2020-07-20

## 2020-07-20 RX ORDER — PATIROMER 25.2 G/1
POWDER, FOR SUSPENSION ORAL
Refills: 0 | Status: ACTIVE | COMMUNITY
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
## 2020-07-20 NOTE — PHYSICAL EXAM
[General Appearance - Well Nourished] : well nourished [General Appearance - Well Developed] : well developed [Normal Conjunctiva] : the conjunctiva exhibited no abnormalities [Heart Rate And Rhythm] : heart rate and rhythm were normal [Murmurs] : no murmurs present [Heart Sounds] : normal S1 and S2 [Abdomen Soft] : soft [Bowel Sounds] : normal bowel sounds [Abdomen Tenderness] : non-tender [Abnormal Walk] : normal gait [Oriented To Time, Place, And Person] : oriented to person, place, and time [Skin Color & Pigmentation] : normal skin color and pigmentation [Nail Clubbing] : no clubbing of the fingernails [FreeTextEntry1] : Trivial edema along lower shins (L>R)

## 2020-11-04 ENCOUNTER — RESULT REVIEW (OUTPATIENT)
Age: 55
End: 2020-11-04

## 2020-11-04 ENCOUNTER — APPOINTMENT (OUTPATIENT)
Dept: HEMATOLOGY ONCOLOGY | Facility: CLINIC | Age: 55
End: 2020-11-04
Payer: MEDICARE

## 2020-11-04 VITALS
HEART RATE: 65 BPM | RESPIRATION RATE: 16 BRPM | DIASTOLIC BLOOD PRESSURE: 81 MMHG | BODY MASS INDEX: 25.51 KG/M2 | SYSTOLIC BLOOD PRESSURE: 154 MMHG | HEIGHT: 65.47 IN | OXYGEN SATURATION: 96 % | WEIGHT: 154.98 LBS | TEMPERATURE: 97.1 F

## 2020-11-04 DIAGNOSIS — R91.8 OTHER NONSPECIFIC ABNORMAL FINDING OF LUNG FIELD: ICD-10-CM

## 2020-11-04 PROCEDURE — 99214 OFFICE O/P EST MOD 30 MIN: CPT

## 2020-11-04 NOTE — CONSULT LETTER
[Dear  ___] : Dear  [unfilled], [Consult Letter:] : I had the pleasure of evaluating your patient, [unfilled]. [Please see my note below.] : Please see my note below. [Consult Closing:] : Thank you very much for allowing me to participate in the care of this patient.  If you have any questions, please do not hesitate to contact me. [Sincerely,] : Sincerely, [FreeTextEntry3] : Phyllis Pizarro MD\par [facility] [place] at [facility]\par

## 2020-11-04 NOTE — HISTORY OF PRESENT ILLNESS
[de-identified] : 53 year old male presents today for initial consult of iron deficiency anemia.  He is s/p gastric bypass in 2003. He had GI bleed a few years later from GI bleed secondary to NSAIDs.  His hgb went to 3. His pouch had to be revised.  He became iron deficient. He did not tolerate oral iron and receives Venofer.  He is having left upper quadrant pain and is having balloon endoscopy at [facility].  Labs dated 3/12/19 show a HGB 10.3, HCT 32.0, MCHC 32.2.  Also noted is a creatinine of 3.0 [de-identified] : Patient presetns today for follow up of anemia- having fatigue- had labs done last Thursday.

## 2020-11-04 NOTE — ASSESSMENT
[FreeTextEntry1] : 54 yo male referred for evaluation of iron deficiency anemia and anemia due to chronic kidney disease.\par Patient underwent gastric bypass surgery and required intermittent iron infusion.\par In addition he has renal failure secondary o DM, with creatinine of 2.8 and EGF 24. 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 \par \par Plan: \par - copper and zinc levels WNL\par -Ferritin 45, patient with fatigue, anemia, malabsorption\par - s/p  IV Injectafer x 2 September 201

## 2020-11-06 NOTE — REVIEW OF SYSTEMS
[Heartburn] : heartburn [Negative] : Heme/Lymph [Numbness] : numbness [Tingling] : tingling [Fainting] : fainting [FreeTextEntry2] : fatigue [FreeTextEntry5] : leg swelling,neck pain Normal

## 2021-03-08 ENCOUNTER — APPOINTMENT (OUTPATIENT)
Dept: HEMATOLOGY ONCOLOGY | Facility: CLINIC | Age: 56
End: 2021-03-08
Payer: MEDICARE

## 2021-03-08 ENCOUNTER — RESULT REVIEW (OUTPATIENT)
Age: 56
End: 2021-03-08

## 2021-03-08 VITALS
HEART RATE: 86 BPM | SYSTOLIC BLOOD PRESSURE: 123 MMHG | HEIGHT: 65.47 IN | TEMPERATURE: 97.7 F | BODY MASS INDEX: 25.5 KG/M2 | OXYGEN SATURATION: 97 % | RESPIRATION RATE: 18 BRPM | WEIGHT: 154.9 LBS | DIASTOLIC BLOOD PRESSURE: 70 MMHG

## 2021-03-08 PROCEDURE — 99214 OFFICE O/P EST MOD 30 MIN: CPT

## 2021-03-08 RX ORDER — SODIUM POLYSTYRENE SULFONATE 4.1 MEQ/G
POWDER, FOR SUSPENSION ORAL; RECTAL
Qty: 454 | Refills: 0 | Status: ACTIVE | COMMUNITY
Start: 2020-12-18

## 2021-03-08 RX ORDER — SEVELAMER CARBONATE 800 MG/1
800 TABLET, FILM COATED ORAL
Qty: 270 | Refills: 0 | Status: ACTIVE | COMMUNITY
Start: 2020-12-21

## 2021-03-08 NOTE — REVIEW OF SYSTEMS
[Negative] : Allergic/Immunologic [Fatigue] : fatigue [Dry Eyes] : dryness of the eyes [FreeTextEntry3] : recent visit to optomologist  [FreeTextEntry8] : frequent urination

## 2021-03-08 NOTE — HISTORY OF PRESENT ILLNESS
[de-identified] : 53 year old male presents today for initial consult of iron deficiency anemia.  He is s/p gastric bypass in 2003. He had GI bleed a few years later from GI bleed secondary to NSAIDs.  His hgb went to 3. His pouch had to be revised.  He became iron deficient. He did not tolerate oral iron and receives Venofer.  He is having left upper quadrant pain and is having balloon endoscopy at [facility].  Labs dated 3/12/19 show a HGB 10.3, HCT 32.0, MCHC 32.2.  Also noted is a creatinine of 3.0 [de-identified] : Patient presents today for follow up of anemia- having fatigue- had labs done last Thursday. Patient states in end stage renal failure and states will start dialysis soon.

## 2021-03-08 NOTE — ASSESSMENT
[FreeTextEntry1] : 56 yo male referred for evaluation of iron deficiency anemia and anemia due to chronic kidney disease.\par Patient underwent gastric bypass surgery and required intermittent iron infusion.\par In addition he has renal failure secondary o DM, with creatinine of 2.8 and EGF 24. 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 \par \par Plan: \par - copper and zinc levels WNL\par -Ferritin 45, patient with fatigue, anemia, malabsorption\par - s/p  IV Injectafer x 2 September 2019\par \par 3/8/2021\par feels well\par S/p gastric bypass, starting dialysis, looking for live donor \par

## 2021-03-08 NOTE — CONSULT LETTER
[Dear  ___] : Dear  [unfilled], [Consult Letter:] : I had the pleasure of evaluating your patient, [unfilled]. [Please see my note below.] : Please see my note below. [Consult Closing:] : Thank you very much for allowing me to participate in the care of this patient.  If you have any questions, please do not hesitate to contact me. [Sincerely,] : Sincerely, [FreeTextEntry3] : Phyllis Pizarro MD\par [facility] [place] at [facility]\par

## 2021-06-09 ENCOUNTER — FORM ENCOUNTER (OUTPATIENT)
Age: 56
End: 2021-06-09

## 2021-09-28 NOTE — ASU PATIENT PROFILE, ADULT - ANESTHESIA, PREVIOUS REACTION, PROFILE
none [Maximal Pain Intensity: 0/10] : 0/10 [Least Pain Intensity: 0/10] : 0/10 [90: Able to carry normal activity; minor signs or symptoms of disease.] : 90: Able to carry normal activity; minor signs or symptoms of disease.

## 2021-11-09 ENCOUNTER — FORM ENCOUNTER (OUTPATIENT)
Age: 56
End: 2021-11-09

## 2021-11-10 ENCOUNTER — APPOINTMENT (OUTPATIENT)
Dept: HEMATOLOGY ONCOLOGY | Facility: CLINIC | Age: 56
End: 2021-11-10

## 2021-12-27 NOTE — ASU PATIENT PROFILE, ADULT - FALL HARM RISK TYPE OF ASSESSMENT
PT CALLING TO STATE THAT HE WAS SUPPOSED TO BE SCHEDULING AN ARTERIAL DUPLEX F/U STUDY W/ JESSIE PISANO THROUGH [facility] CENTRAL SCHEDULING, BUT WAS GIVEN OUR ADDRESS.  PT STATES HE WAS SEEING DR. TIPTON, BUT DR. TIPTON HAS RETIRED.  CALLED CENTRAL SCHEDULING, WHO ADVISED THAT PT NEEDS REFERRAL SENT IN IN ORDER TO SCHEDULE.  ADVISED PT TO F/U W/ DR. MONIQUE TO ENSURE THAT REFERRAL MAKES IT TO THE CORRECT PLACE SO THAT HE CAN GET THIS SCHEDULED.   Admission

## 2022-01-01 ENCOUNTER — LABORATORY RESULT (OUTPATIENT)
Age: 57
End: 2022-01-01

## 2022-01-01 ENCOUNTER — APPOINTMENT (OUTPATIENT)
Dept: UROLOGY | Facility: CLINIC | Age: 57
End: 2022-01-01

## 2022-01-01 VITALS
SYSTOLIC BLOOD PRESSURE: 100 MMHG | HEIGHT: 66 IN | WEIGHT: 145 LBS | TEMPERATURE: 97.4 F | DIASTOLIC BLOOD PRESSURE: 78 MMHG | BODY MASS INDEX: 23.3 KG/M2

## 2022-01-01 DIAGNOSIS — Z96.0 PRESENCE OF UROGENITAL IMPLANTS: ICD-10-CM

## 2022-01-01 DIAGNOSIS — R33.9 RETENTION OF URINE, UNSPECIFIED: ICD-10-CM

## 2022-01-01 DIAGNOSIS — R35.0 FREQUENCY OF MICTURITION: ICD-10-CM

## 2022-01-01 DIAGNOSIS — N52.9 MALE ERECTILE DYSFUNCTION, UNSPECIFIED: ICD-10-CM

## 2022-01-01 PROCEDURE — 51741 ELECTRO-UROFLOWMETRY FIRST: CPT

## 2022-01-01 PROCEDURE — 99214 OFFICE O/P EST MOD 30 MIN: CPT | Mod: 25

## 2022-01-01 PROCEDURE — 51798 US URINE CAPACITY MEASURE: CPT | Mod: 59

## 2022-03-03 ENCOUNTER — RESULT REVIEW (OUTPATIENT)
Age: 57
End: 2022-03-03

## 2022-03-03 ENCOUNTER — APPOINTMENT (OUTPATIENT)
Dept: HEMATOLOGY ONCOLOGY | Facility: CLINIC | Age: 57
End: 2022-03-03
Payer: MEDICARE

## 2022-03-03 ENCOUNTER — APPOINTMENT (OUTPATIENT)
Dept: THORACIC SURGERY | Facility: CLINIC | Age: 57
End: 2022-03-03
Payer: MEDICARE

## 2022-03-03 VITALS
TEMPERATURE: 97.9 F | SYSTOLIC BLOOD PRESSURE: 114 MMHG | WEIGHT: 145.8 LBS | HEIGHT: 65.98 IN | OXYGEN SATURATION: 98 % | HEART RATE: 76 BPM | BODY MASS INDEX: 23.43 KG/M2 | RESPIRATION RATE: 18 BRPM | DIASTOLIC BLOOD PRESSURE: 63 MMHG

## 2022-03-03 VITALS — HEIGHT: 66 IN | BODY MASS INDEX: 22.5 KG/M2 | WEIGHT: 140 LBS

## 2022-03-03 DIAGNOSIS — Z98.84 BARIATRIC SURGERY STATUS: ICD-10-CM

## 2022-03-03 DIAGNOSIS — D50.9 IRON DEFICIENCY ANEMIA, UNSPECIFIED: ICD-10-CM

## 2022-03-03 DIAGNOSIS — Z87.891 PERSONAL HISTORY OF NICOTINE DEPENDENCE: ICD-10-CM

## 2022-03-03 DIAGNOSIS — E55.9 VITAMIN D DEFICIENCY, UNSPECIFIED: ICD-10-CM

## 2022-03-03 DIAGNOSIS — E11.9 TYPE 2 DIABETES MELLITUS W/OUT COMPLICATIONS: ICD-10-CM

## 2022-03-03 DIAGNOSIS — I35.0 NONRHEUMATIC AORTIC (VALVE) STENOSIS: ICD-10-CM

## 2022-03-03 PROCEDURE — 36415 COLL VENOUS BLD VENIPUNCTURE: CPT

## 2022-03-03 PROCEDURE — 99214 OFFICE O/P EST MOD 30 MIN: CPT

## 2022-03-03 PROCEDURE — G0296 VISIT TO DETERM LDCT ELIG: CPT

## 2022-03-03 NOTE — CONSULT LETTER
[Dear  ___] : Dear  [unfilled], [Consult Letter:] : I had the pleasure of evaluating your patient, [unfilled]. [Please see my note below.] : Please see my note below. [Consult Closing:] : Thank you very much for allowing me to participate in the care of this patient.  If you have any questions, please do not hesitate to contact me. [Sincerely,] : Sincerely, [FreeTextEntry3] : Phyllis Pizarro MD\par [facility] [place] at [facility]\par

## 2022-03-03 NOTE — HISTORY OF PRESENT ILLNESS
[Former] : former smoker [_____ pack-years] : [unfilled] pack-years [TextBox_13] : Referred by Dr. Sandra Nguyễn\par \par LOGAN KEARNEY   had telephonic visit for a review of eligibility and discussion of the Low dose CT lung cancer screening program. A telephonic visit occurred due to the patient not having access to a smart phone or a computer for an audio/visual visit.  The following was reviewed and confirmed the patient meets screening eligibility criteria.\par -Age 56 year\par Smoking Status:\par -Former smoker\par Smoked 2 PPD for 10 years, then quit for 17 years, then smoked 2 PPD for 14 years, then quit 3 weeks ago.\par -Number of pack years smokin\par -Number of years since quitting smoking: 3 weeks\par -Quit year: 2022\par \par Mr. KEARNEY   denies any signs or symptoms of lung cancer including new cough, changing cough, hemoptysis, and unintentional weight loss. \par \par Mr. KEARNEY reports history of CAD with 11 stents, aortic stenosis, PAD, renal failure- starting dialysis and is in the process for evaluation for a kidney transplant. He denies any personal history of lung cancer. Reports no lung cancer in a 1st degree relative. Reports no lung cancer in a 2nd degree relative. Denies any history of lung disease. Denies any  history of  occupational exposures. Has no exposure to 2nd hand smoke.\par  [TextBox_10] : 2022

## 2022-03-03 NOTE — ASSESSMENT
[FreeTextEntry1] : 55 yo male referred for evaluation of iron deficiency anemia and anemia due to chronic kidney disease.\par Patient underwent gastric bypass surgery and required intermittent iron infusion.\par In addition he has renal failure secondary o DM, with creatinine of 2.8 and EGFR 24. 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 \par \par Plan: \par - copper and zinc levels WNL\par - progression of anemia\par - s/p  IV Injectafer x 2 September 2019\par - S/p gastric bypass, starting dialysis, looking for live donor - starting PD dialysis.\par - Decline in Hg - likely multifactorial, CKD and h/o iron deficiency and blood loss. \par - Echo - aortic stenosis. r/o hemolysis, check LDH\par - Blood drawn in office. Ordered and reviewed.\par \par \par \par

## 2022-03-03 NOTE — ASSESSMENT
[Action] : Action: The patient is actively trying to quit smoking or has quit smoking in the past 6 months  [de-identified] : He reports he is currently vaping 18 mg cartrages. He is working with a smoking cessation counselor at [facility]. His motivation id he needs a kidney and they will not do transplant if he is still smoking.

## 2022-03-03 NOTE — REASON FOR VISIT
[Home] : at home, [unfilled] , at the time of the visit. [Medical Office: ([facility])___] : at the medical office located in  [Verbal consent obtained from patient] : the patient, [unfilled] [Annual Follow-Up] : an annual follow-up visit [Review of Eligibility] : review of eligibility [Low-Dose CT Screening Discussion] : low-dose CT lung cancer screening discussion

## 2022-03-03 NOTE — REVIEW OF SYSTEMS
Discharge instructions:     1. RTC 03/18/2021  2. Patient was sent to ob triage - directions given - patient verbalized understanding - on her way there now.             Np, CMA    [Fatigue] : fatigue [Dry Eyes] : dryness of the eyes [Negative] : Allergic/Immunologic [FreeTextEntry3] : recent visit to optomologist  [FreeTextEntry8] : frequent urination

## 2022-03-03 NOTE — HISTORY OF PRESENT ILLNESS
[de-identified] : Patient presents today for follow up of anemia- having fatigue- had labs done last Thursday. Patient states in end stage renal failure and states will start dialysis soon.    [de-identified] : 53 year old male presents today for initial consult of iron deficiency anemia.  He is s/p gastric bypass in 2003. He had GI bleed a few years later from GI bleed secondary to NSAIDs.  His hgb went to 3. His pouch had to be revised.  He became iron deficient. He did not tolerate oral iron and receives Venofer.  He is having left upper quadrant pain and is having balloon endoscopy at [facility].  Labs dated 3/12/19 show a HGB 10.3, HCT 32.0, MCHC 32.2.  Also noted is a creatinine of 3.0

## 2022-03-03 NOTE — PLAN
[Smoking Cessation] : smoking cessation [FreeTextEntry1] : Plan:\par \par -Low dose CT chest for lung cancer screening. Dr. Sandra Nguyễn ordered the low dose CT.      \par \par He will bring prescription to radiology appointment. \par \par -Follow up with patient and his referring provider after his LDCT results have been reviewed by the multidisciplinary clinical team, if needed.\par \par -Encourage continued smoking abstinence.\par \par -Encouraged vaping cessation.\par \par -Patient declines referral to CTC and CCX\par \par Should I screen? tool utilized. 6 Year risk of lung cancer is  1.4 %.\par \par Engaged in discussion regarding risks of screening during Covid-19 pandemic and precautions that are being used  to reduce exposure.\par \par Engaged in shared decision making with Mr. KEARNEY . Answered all questions. He verbalized understanding and agreement. He knows to call back with and questions or concerns.\par

## 2022-03-18 PROBLEM — D50.9 IRON DEFICIENCY ANEMIA: Status: ACTIVE | Noted: 2019-03-26

## 2022-03-18 PROBLEM — E55.9 VITAMIN D DEFICIENCY: Status: ACTIVE | Noted: 2019-03-27

## 2022-03-18 PROBLEM — I35.0 AORTIC STENOSIS: Status: ACTIVE | Noted: 2022-03-03

## 2022-03-18 PROBLEM — E11.9 DIABETES TYPE 2, CONTROLLED: Status: ACTIVE | Noted: 2020-07-16

## 2022-03-18 PROBLEM — Z98.84 BARIATRIC SURGERY STATUS: Status: ACTIVE | Noted: 2019-03-26

## 2022-03-21 ENCOUNTER — RESULT REVIEW (OUTPATIENT)
Age: 57
End: 2022-03-21

## 2022-03-21 ENCOUNTER — APPOINTMENT (OUTPATIENT)
Dept: HEMATOLOGY ONCOLOGY | Facility: CLINIC | Age: 57
End: 2022-03-21

## 2022-03-21 VITALS
SYSTOLIC BLOOD PRESSURE: 126 MMHG | WEIGHT: 142 LBS | DIASTOLIC BLOOD PRESSURE: 72 MMHG | HEIGHT: 65.98 IN | BODY MASS INDEX: 22.82 KG/M2 | RESPIRATION RATE: 18 BRPM | OXYGEN SATURATION: 98 % | HEART RATE: 71 BPM | TEMPERATURE: 97.9 F

## 2022-03-25 ENCOUNTER — NON-APPOINTMENT (OUTPATIENT)
Age: 57
End: 2022-03-25

## 2022-04-06 ENCOUNTER — FORM ENCOUNTER (OUTPATIENT)
Age: 57
End: 2022-04-06

## 2022-04-08 ENCOUNTER — NON-APPOINTMENT (OUTPATIENT)
Age: 57
End: 2022-04-08

## 2022-05-04 ENCOUNTER — NON-APPOINTMENT (OUTPATIENT)
Age: 57
End: 2022-05-04

## 2022-05-04 DIAGNOSIS — Z82.49 FAMILY HISTORY OF ISCHEMIC HEART DISEASE AND OTHER DISEASES OF THE CIRCULATORY SYSTEM: ICD-10-CM

## 2022-05-04 DIAGNOSIS — Z87.19 PERSONAL HISTORY OF OTHER DISEASES OF THE DIGESTIVE SYSTEM: ICD-10-CM

## 2022-05-04 DIAGNOSIS — N19 UNSPECIFIED KIDNEY FAILURE: ICD-10-CM

## 2022-05-04 DIAGNOSIS — Z82.5 FAMILY HISTORY OF ASTHMA AND OTHER CHRONIC LOWER RESPIRATORY DISEASES: ICD-10-CM

## 2022-05-04 RX ORDER — ASPIRIN 81 MG
81 TABLET, DELAYED RELEASE (ENTERIC COATED) ORAL
Refills: 0 | Status: ACTIVE | COMMUNITY

## 2022-05-04 RX ORDER — RIVAROXABAN 2.5 MG/1
2.5 TABLET, FILM COATED ORAL
Refills: 0 | Status: ACTIVE | COMMUNITY

## 2022-05-26 ENCOUNTER — APPOINTMENT (OUTPATIENT)
Dept: UROLOGY | Facility: CLINIC | Age: 57
End: 2022-05-26
Payer: MEDICARE

## 2022-05-26 VITALS
SYSTOLIC BLOOD PRESSURE: 122 MMHG | HEIGHT: 66 IN | DIASTOLIC BLOOD PRESSURE: 70 MMHG | WEIGHT: 145 LBS | BODY MASS INDEX: 23.3 KG/M2 | TEMPERATURE: 98.1 F

## 2022-05-26 DIAGNOSIS — I12.9 HYPERTENSIVE CHRONIC KIDNEY DISEASE WITH STAGE 1 THROUGH STAGE 4 CHRONIC KIDNEY DISEASE, OR UNSPECIFIED CHRONIC KIDNEY DISEASE: ICD-10-CM

## 2022-05-26 DIAGNOSIS — N18.4 HYPERTENSIVE CHRONIC KIDNEY DISEASE WITH STAGE 1 THROUGH STAGE 4 CHRONIC KIDNEY DISEASE, OR UNSPECIFIED CHRONIC KIDNEY DISEASE: ICD-10-CM

## 2022-05-26 DIAGNOSIS — I25.10 ATHEROSCLEROTIC HEART DISEASE OF NATIVE CORONARY ARTERY W/OUT ANGINA PECTORIS: ICD-10-CM

## 2022-05-26 DIAGNOSIS — T83.490A OTHER MECHANICAL COMPLICATION OF IMPLANTED PENILE PROSTHESIS, INITIAL ENCOUNTER: ICD-10-CM

## 2022-05-26 PROCEDURE — 99213 OFFICE O/P EST LOW 20 MIN: CPT | Mod: 25

## 2022-05-26 PROCEDURE — 51798 US URINE CAPACITY MEASURE: CPT | Mod: 59

## 2022-05-26 PROCEDURE — 99203 OFFICE O/P NEW LOW 30 MIN: CPT | Mod: 25

## 2022-05-26 PROCEDURE — 51741 ELECTRO-UROFLOWMETRY FIRST: CPT

## 2022-05-26 RX ORDER — METOPROLOL SUCCINATE 25 MG/1
25 TABLET, EXTENDED RELEASE ORAL
Refills: 0 | Status: DISCONTINUED | COMMUNITY
Start: 2019-03-01 | End: 2022-05-26

## 2022-05-26 RX ORDER — TRIAMCINOLONE ACETONIDE 1 MG/G
0.1 OINTMENT TOPICAL
Refills: 0 | Status: DISCONTINUED | COMMUNITY
Start: 2020-07-15 | End: 2022-05-26

## 2022-05-26 RX ORDER — IRBESARTAN 75 MG/1
75 TABLET ORAL
Refills: 0 | Status: ACTIVE | COMMUNITY
Start: 2020-03-04

## 2022-05-26 RX ORDER — RISPERIDONE 0.5 MG/1
0.5 TABLET, FILM COATED ORAL
Refills: 0 | Status: DISCONTINUED | COMMUNITY
Start: 2018-12-22 | End: 2022-05-26

## 2022-05-26 RX ORDER — CHLORTHALIDONE 25 MG/1
25 TABLET ORAL
Refills: 0 | Status: DISCONTINUED | COMMUNITY
Start: 2017-07-25 | End: 2022-05-26

## 2022-05-26 RX ORDER — VARENICLINE TARTRATE 0.5 MG/1
0.5 TABLET, FILM COATED ORAL
Refills: 0 | Status: DISCONTINUED | COMMUNITY
Start: 2020-05-14 | End: 2022-05-26

## 2022-05-26 RX ORDER — PERMETHRIN 50 MG/G
5 CREAM TOPICAL
Refills: 0 | Status: DISCONTINUED | COMMUNITY
Start: 2020-07-15 | End: 2022-05-26

## 2022-05-26 RX ORDER — CLOPIDOGREL BISULFATE 75 MG/1
75 TABLET, FILM COATED ORAL
Refills: 0 | Status: DISCONTINUED | COMMUNITY
Start: 2018-02-05 | End: 2022-05-26

## 2022-05-26 RX ORDER — TORSEMIDE 20 MG/1
20 TABLET ORAL
Refills: 0 | Status: DISCONTINUED | COMMUNITY
End: 2022-05-26

## 2022-05-26 RX ORDER — FLUTICASONE PROPIONATE AND SALMETEROL 100; 50 UG/1; UG/1
100-50 POWDER RESPIRATORY (INHALATION)
Refills: 0 | Status: DISCONTINUED | COMMUNITY
Start: 2019-07-22 | End: 2022-05-26

## 2022-05-26 NOTE — ASSESSMENT
[FreeTextEntry1] : BLADDER SCAN:\par Indication: Increased frequency of urination. \par Initial Volume:286    cc\par PVR: 81  cc\par Results: Stress urinary incontinence. \par Recommendations: No Therapy Needed.\par \par \par URO FLOWMETRY:\par Indication: Increased frequency of urination. \par Urinary Flow Rate:  20.6  m/s\par Results: Stress urinary incontinence    \par Recommendations: No therapy needed.\par \par \par

## 2022-05-26 NOTE — HISTORY OF PRESENT ILLNESS
[FreeTextEntry1] : The patient had his peritoneal dialysis catheter inserted.  He also at my recommendation had a CAT scan in order to evaluate the location of the penile implant reservoir in case a kidney would be available for a transplant and emergent basis.  The CAT scan was performed and was entirely normal it reveals that the reservoir is in the left pelvis.  The patient consulted with a kidney transplant surgeon who will place the transplant kidney into the the right side.  He complains that he is unable to inflate the implant fully.  He states that his  is very weak and that his scrotum is very tender.

## 2022-11-09 PROBLEM — R35.0 FREQUENCY OF URINATION: Status: ACTIVE | Noted: 2022-05-04

## 2022-11-09 PROBLEM — Z96.0 S/P INSERTION OF PENILE IMPLANT: Status: ACTIVE | Noted: 2022-05-26

## 2022-11-09 PROBLEM — N52.9 ED (ERECTILE DYSFUNCTION) OF ORGANIC ORIGIN: Status: ACTIVE | Noted: 2022-05-04

## 2022-11-09 PROBLEM — R33.9 INCOMPLETE BLADDER EMPTYING: Status: ACTIVE | Noted: 2022-05-04

## 2022-11-09 NOTE — HISTORY OF PRESENT ILLNESS
[FreeTextEntry1] : The patient had his peritoneal dialysis catheter inserted. Currently going through the process of looking for a transplant donor.\par \par In terms of sexual intercourse, patient is satisfied. Has had oral and penetrative vaginal intercourse multiple times without difficulty. Implant is working well.\par Notes hypersensitivity and some pain on the skin during intercourse.\par \par Also notes pressure sensation over the bladder. No burning with urination or fevers.

## 2022-11-09 NOTE — ASSESSMENT
[FreeTextEntry1] : BLADDER SCAN:\par Indication: Increased frequency of urination. \par Initial Volume:  458  cc\par PVR: 61  cc\par Results: Stress urinary incontinence. \par Recommendations: No Therapy Needed.\par \par URO FLOWMETRY:\par Indication: Increased frequency of urination. \par Urinary Flow Rate: 28.9   m/s\par Results: Stress urinary incontinence    \par Recommendations: No therapy needed.\par \par Obtain UA and UCx to r/o UTI\par

## 2023-01-01 ENCOUNTER — NON-APPOINTMENT (OUTPATIENT)
Age: 58
End: 2023-01-01

## 2023-02-22 NOTE — REVIEW OF SYSTEMS
[Negative] : Heme/Lymph Secondary Intention Text (Leave Blank If You Do Not Want): The defect will heal with secondary intention.